# Patient Record
Sex: FEMALE | Race: WHITE | NOT HISPANIC OR LATINO
[De-identification: names, ages, dates, MRNs, and addresses within clinical notes are randomized per-mention and may not be internally consistent; named-entity substitution may affect disease eponyms.]

---

## 2023-09-15 PROBLEM — Z00.00 ENCOUNTER FOR PREVENTIVE HEALTH EXAMINATION: Status: ACTIVE | Noted: 2023-09-15

## 2023-09-18 ENCOUNTER — APPOINTMENT (OUTPATIENT)
Dept: HEMATOLOGY ONCOLOGY | Facility: CLINIC | Age: 68
End: 2023-09-18

## 2023-12-26 ENCOUNTER — NON-APPOINTMENT (OUTPATIENT)
Age: 68
End: 2023-12-26

## 2024-08-13 ENCOUNTER — APPOINTMENT (OUTPATIENT)
Dept: PAIN MANAGEMENT | Facility: CLINIC | Age: 69
End: 2024-08-13
Payer: MEDICARE

## 2024-08-13 VITALS
OXYGEN SATURATION: 97 % | HEART RATE: 61 BPM | BODY MASS INDEX: 24.4 KG/M2 | WEIGHT: 161 LBS | SYSTOLIC BLOOD PRESSURE: 127 MMHG | HEIGHT: 68 IN | DIASTOLIC BLOOD PRESSURE: 69 MMHG

## 2024-08-13 PROCEDURE — 99203 OFFICE O/P NEW LOW 30 MIN: CPT

## 2024-08-13 RX ORDER — ZOLPIDEM TARTRATE 5 MG/1
5 TABLET, FILM COATED ORAL
Refills: 0 | Status: ACTIVE | COMMUNITY

## 2024-08-13 RX ORDER — VALACYCLOVIR HYDROCHLORIDE 500 MG/1
500 TABLET, FILM COATED ORAL
Refills: 0 | Status: ACTIVE | COMMUNITY

## 2024-08-13 NOTE — HISTORY OF PRESENT ILLNESS
[Back Pain] : back pain [Joint Pain] : joint  [___ wks] : [unfilled] week(s) ago [Episodic] : episodic [7] : a maximum pain level of 7/10 [Sharp] : sharp [Dull] : dull [Aching] : aching [Throbbing] : throbbing [Sitting] : sitting [Shooting] : shooting [Transitioning] : transitioning [Bending] : bending [Lifting] : lifting [Laying] : laying [Heat] : heat [Ice] : ice [Rest] : rest [FreeTextEntry1] : FABIANO Webster, a 68-year-old female, presents with a 2-week history of episodic lumbar spine and RIGHT hip  and leg pain, rated consistently at 7/10. The pain is described as sharp, dull, aching, throbbing, and shooting, with aggravation from sitting, transitioning, bending, and lifting, and alleviation with rest, laying, heat, ice, and stretches. There is no radiation of pain, and she denies any numbness, weakness, or bowel/bladder incontinence. The pain has been functionally and emotionally disabling, hindering daily activities such as routine housework. She has attempted a provider-directed stretching regimen and over-the-counter Advil with persistent and progressing symptoms, but has not seen a pain management specialist before. [FreeTextEntry7] : lumbar spine pain and uin the right joint hip. [FreeTextEntry4] : patient does stretches which helps with the pain. Patient takes Advil over the counter.

## 2024-08-13 NOTE — DATA REVIEWED
[FreeTextEntry1] : August 5, 2024 x-ray lumbar spine Levoscoliosis with mild degenerative changes at L4-5 and L5-S1, with spondylolisthesis disc degeneration at L5-S1.

## 2024-08-13 NOTE — PHYSICAL EXAM
[de-identified] : General Appearance: Level of consciousness: Alert Body habitus: Well-nourished Signs of distress: Some noticeable discomfort. Hygiene: Clean   Psychiatric: Appropriate mood and affect. Cooperative.   Skin: Nailbeds pink with no cyanosis or clubbing. Lesions or rashes: None observed   Head and Neck: The head is normocephalic and atraumatic Eyes: Conjunctivae  clear without exudates. Sclera is non-icteric Ears: No discharge. Hearing is intact with good acuity Nose: No discharge. No nasal flaring Mouth and throat: Trachea Midline, teeth and gums are without obvious lesion   Respiratory: Breathing pattern: Normal Chest movement: Symmetrical Use of accessory muscles: Absent Cough: Absent Wheezing: Absent   Cardiovascular System: Pulse: Regular Cyanosis: Absent   Abdomen: Inspection: No distention Visible pulsations: Absent   Musculoskeletal System: Muscle strength:   strength is normal bilaterally. Gait: Steady, NO assistive device Posture: Upright Rises from a seated position with SOME difficulty due to pain     Spine: No gross deformity or signs of trauma. Curvature of the cervical, thoracic, and lumbar spine are within normal limits. Spinous processes are midline. NO tenderness of spinous processes. Paraspinal musculature is NOT hypertonic NO discomfort is noted with flexion MILD discomfort is noted with extension No discomfort is noted with side-to-side rotation   Straight leg raise test is positive RIGHT     Neurological System: Cranial nerves 2-12: Grossly intact Motor function: Moving all extremities against gravity Dorsi/plantar flexion is normal bilaterally. Sensation: No gross deficit to light touch

## 2024-08-13 NOTE — ASSESSMENT
[FreeTextEntry1] : Ms. DOMENICO HURT is a 68 year F suffering from low back and right leg pain, that based upon today's subjective complaints, physical examination, and XRAY  review, is likely secondaty to l3/4 disc protrusion suspected and associated lumbar radiculopathy  >> Medications  Chronic opioid use for non-malignant pain, in particular at high doses would not be recommended since it can potentially lead to hyperalgesia (hypersensitivity), tolerance and addiction.      >> Interventions   None indicated at this time   >> Therapy and Other Modalities    PT 2x/week for 4 weeks for lumbar stenosis Treatment includes spinal rehab, strengthening of core, glutes, abductors, LS paraspinals, Quadratus Lumborum, and targeted myofascial release. Incorporate massage techniques to reduce muscle tension and promote circulation. Myofascial release and massage will address tight zones, particularly around the scoliotic apex. Aerobic conditioning exercises to improve endurance. Stretching routines emphasizing hamstrings, hip flexors, and lumbar extensors. Modalities: US, Heat, TENS trial, and cold therapy if indicated. Manual traction may be applied for decompression. ROM exercises for maintaining joint flexibility. Precautions: Monitor for falls, adhere to universal safety guidelines, and ensure proper form during exercises   >> Imaging and Other Studies  MRI Lumbar for suspected RIGHT L3/4 disc herniation  >> Consults  F/u PCP regarding RIGHT sided sclerotic lesion on ilium

## 2024-08-23 ENCOUNTER — APPOINTMENT (OUTPATIENT)
Dept: PAIN MANAGEMENT | Facility: CLINIC | Age: 69
End: 2024-08-23
Payer: MEDICARE

## 2024-08-23 DIAGNOSIS — M54.16 RADICULOPATHY, LUMBAR REGION: ICD-10-CM

## 2024-08-23 PROCEDURE — 99213 OFFICE O/P EST LOW 20 MIN: CPT

## 2024-08-23 RX ORDER — GABAPENTIN 100 MG/1
100 CAPSULE ORAL
Qty: 90 | Refills: 5 | Status: ACTIVE | COMMUNITY
Start: 2024-08-23 | End: 1900-01-01

## 2024-08-23 NOTE — ASSESSMENT
[FreeTextEntry1] : Ms. DOMENICO HURT is a 68 year F suffering from low back and right leg pain, that based upon today's subjective complaints, physical examination, and XRAY review, is likely secondaty to l3/4 disc protrusion suspected and associated lumbar radiculopathy  >> Medications  Chronic opioid use for non-malignant pain, in particular at high doses would not be recommended since it can potentially lead to hyperalgesia (hypersensitivity), tolerance and addiction.   >> Interventions  Arrange for RIGHT L3 and 4 transforaminal epidural steroid injection under fluoroscopic guidance. Success rate and possible complications discussed with patient in detail.  >> Therapy and Other Modalities   PT 2x/week for 4 weeks for lumbar stenosis Treatment includes spinal rehab, strengthening of core, glutes, abductors, LS paraspinals, Quadratus Lumborum, and targeted myofascial release. Incorporate massage techniques to reduce muscle tension and promote circulation. Myofascial release and massage will address tight zones, particularly around the scoliotic apex. Aerobic conditioning exercises to improve endurance. Stretching routines emphasizing hamstrings, hip flexors, and lumbar extensors. Modalities: US, Heat, TENS trial, and cold therapy if indicated. Manual traction may be applied for decompression. ROM exercises for maintaining joint flexibility. Precautions: Monitor for falls, adhere to universal safety guidelines, and ensure proper form during exercises   >> Imaging and Other Studies  I have personally reviewed the images in detail together with the patient today, and I have answered all questions regarding this condition to the best of my ability, to the patient's satisfaction.  >> Consults  F/u PCP regarding RIGHT sided sclerotic lesion on ilium. What Is The Reason For Today's Visit?: Full Body Skin Examination What Is The Reason For Today's Visit? (Being Monitored For X): concerning skin lesions on an annual basis

## 2024-08-23 NOTE — HISTORY OF PRESENT ILLNESS
[Neck Pain] : neck pain [FreeTextEntry1] : Interval Note:  Since last visit the pain intensity has persisted  Recent MRI completed  Ongoing right hip and leg  Medication has been allowing patient to go about activities of daily living with less pain.  Moving bowels regularly. No recent falls.   Patient denies any bowel/bladder incontinence, no saddle/perineal anesthesia or any other red flag signs or symptoms.  ----  HPI - Alexandria Webster, a 68-year-old female, presents with a 2-week history of episodic lumbar spine and RIGHT hip  and leg pain, rated consistently at 7/10. The pain is described as sharp, dull, aching, throbbing, and shooting, with aggravation from sitting, transitioning, bending, and lifting, and alleviation with rest, laying, heat, ice, and stretches. There is no radiation of pain, and she denies any numbness, weakness, or bowel/bladder incontinence. The pain has been functionally and emotionally disabling, hindering daily activities such as routine housework. She has attempted a provider-directed stretching regimen and over-the-counter Advil with persistent and progressing symptoms, but has not seen a pain management specialist before.

## 2024-08-23 NOTE — DATA REVIEWED
[FreeTextEntry1] :  Referring Physician: Boyd Knapp DO Exam: MRI of the Lumbar Spine  HISTORY: Low back pain.  FINDINGS:  At L1-2, there are thickened ligamentum flava. At L2-3, there are thickened ligamentum flava, with a narrowed AP diameter of the spinal canal suggesting moderately severe spinal stenosis. At L3-4, there is a mild bulging annulus. There are thickened ligamentum flava and bilateral facet effusions. There is moderate severe spinal stenosis. At L4-5, there are thickened ligamentum flava. At L5-S1, unremarkable. IMPRESSION:  Moderately severe spinal stenosis at L3-4 related to a mild bulging annulus and thickened ligamentum flava. There are bilateral facet effusions which could be physiologic or suggest arthropathy. Moderately severe spinal stenosis at L2-3 related to thickened ligamentum flava and narrowed AP diameter of the spinal canal.      August 5, 2024 x-ray lumbar spine Levoscoliosis with mild degenerative changes at L4-5 and L5-S1, with spondylolisthesis disc degeneration at L5-S1.

## 2024-08-23 NOTE — REASON FOR VISIT
[Home] : at home, [unfilled] , at the time of the visit. [Medical Office: ([facility])___] : at the medical office located in  [Patient] : the patient

## 2024-08-23 NOTE — PHYSICAL EXAM
[de-identified] : Physical Exam (Telemedicine):  Gen: AAOX3, NAD HEENT: PERRLA, EOMI, NCAT, NP Pulmonary: No Signs of Respiratory Distress MSK: AROM WFL, Limitations painful ROM Neurological: Grossly neurologically intact, Noted deficits none, positive SLR RIGHT Gait: Normal, Non-antalgic, Sans AD Derm: No Rash, (-)Lesions, (-)Erythema, (-)Cyanosis  Psych: Calm, Cooperative, Conversational  Disclaimer: This is a limited examination for the purposes of conducting a telemedicine visit during the COVID-19 pandemic. Physical exam maneuvers were modified as necessary to allow patient to self perform. A focused physician physical examination will be performed during in person visits and should be referred to to determine need for further testing, interventions or degree of disability.

## 2024-09-04 ENCOUNTER — APPOINTMENT (OUTPATIENT)
Dept: PAIN MANAGEMENT | Facility: CLINIC | Age: 69
End: 2024-09-04
Payer: MEDICARE

## 2024-09-04 VITALS
DIASTOLIC BLOOD PRESSURE: 60 MMHG | HEIGHT: 68 IN | OXYGEN SATURATION: 98 % | SYSTOLIC BLOOD PRESSURE: 124 MMHG | BODY MASS INDEX: 24.4 KG/M2 | HEART RATE: 65 BPM | WEIGHT: 161 LBS

## 2024-09-04 DIAGNOSIS — M43.10 SPONDYLOLISTHESIS, SITE UNSPECIFIED: ICD-10-CM

## 2024-09-04 PROCEDURE — 99213 OFFICE O/P EST LOW 20 MIN: CPT

## 2024-09-04 NOTE — DATA REVIEWED
[FreeTextEntry1] :   HISTORY: Low back pain.  FINDINGS:  At L1-2, there are thickened ligamentum flava. At L2-3, there are thickened ligamentum flava, with a narrowed AP diameter of the spinal canal suggesting moderately severe spinal stenosis. At L3-4, there is a mild bulging annulus. There are thickened ligamentum flava and bilateral facet effusions. There is moderate severe spinal stenosis. At L4-5, there are thickened ligamentum flava. At L5-S1, unremarkable. IMPRESSION:  Moderately severe spinal stenosis at L3-4 related to a mild bulging annulus and thickened ligamentum flava. There are bilateral facet effusions which could be physiologic or suggest arthropathy. Moderately severe spinal stenosis at L2-3 related to thickened ligamentum flava and narrowed AP diameter of the spinal canal.      August 5, 2024 x-ray lumbar spine Levoscoliosis with mild degenerative changes at L4-5 and L5-S1, with spondylolisthesis disc degeneration at L5-S1.

## 2024-09-04 NOTE — ASSESSMENT
[FreeTextEntry1] : Ms. DOMENICO HURT is a 68 year F suffering from low back and right leg pain, that based upon today's subjective complaints, physical examination, and XRAY review, is likely secondaty to l3/4 disc protrusion suspected and associated lumbar radiculopathy  >> Medications  Chronic opioid use for non-malignant pain, in particular at high doses would not be recommended since it can potentially lead to hyperalgesia (hypersensitivity), tolerance and addiction.   >> Interventions  Consider for RIGHT L3 and 4 transforaminal epidural steroid injection under fluoroscopic guidance. Success rate and possible complications discussed with patient in detail.  >> Therapy and Other Modalities   PT 2x/week for 4 weeks for lumbar stenosis Treatment includes spinal rehab, strengthening of core, glutes, abductors, LS paraspinals, Quadratus Lumborum, and targeted myofascial release. Incorporate massage techniques to reduce muscle tension and promote circulation. Myofascial release and massage will address tight zones, particularly around the scoliotic apex. Aerobic conditioning exercises to improve endurance. Stretching routines emphasizing hamstrings, hip flexors, and lumbar extensors. Modalities: US, Heat, TENS trial, and cold therapy if indicated. Manual traction may be applied for decompression. ROM exercises for maintaining joint flexibility. Precautions: Monitor for falls, adhere to universal safety guidelines, and ensure proper form during exercises   >> Imaging and Other Studies  I have personally reviewed the images in detail together with the patient today, and I have answered all questions regarding this condition to the best of my ability, to the patient's satisfaction.  >> Consults  F/u PCP regarding RIGHT sided sclerotic lesion on ilium.

## 2024-09-04 NOTE — PHYSICAL EXAM
[de-identified] : General Appearance: Level of consciousness: Alert Body habitus: Well-nourished Signs of distress: Some noticeable discomfort. Hygiene: Clean   Psychiatric: Appropriate mood and affect. Cooperative.   Skin: Nailbeds pink with no cyanosis or clubbing. Lesions or rashes: None observed   Head and Neck: The head is normocephalic and atraumatic Eyes: Conjunctivae  clear without exudates. Sclera is non-icteric Ears: No discharge. Hearing is intact with good acuity Nose: No discharge. No nasal flaring Mouth and throat: Trachea Midline, teeth and gums are without obvious lesion   Respiratory: Breathing pattern: Normal Chest movement: Symmetrical Use of accessory muscles: Absent Cough: Absent Wheezing: Absent   Cardiovascular System: Pulse: Regular Cyanosis: Absent   Abdomen: Inspection: No distention Visible pulsations: Absent   Musculoskeletal System: Muscle strength:   strength is normal bilaterally. Gait: Steady, NO assistive device Posture: Upright Rises from a seated position with SOME difficulty due to pain     Spine: No gross deformity or signs of trauma. Curvature of the cervical, thoracic, and lumbar spine are within normal limits. Spinous processes are midline. NO tenderness of spinous processes. Paraspinal musculature is NOT hypertonic NO discomfort is noted with flexion MILD discomfort is noted with extension No discomfort is noted with side-to-side rotation   Straight leg raise test is negative bilaterally.   Joint examination: No Swelling, No gross deformities.     Neurological System: Cranial nerves 2-12: Grossly intact Motor function: Moving all extremities against gravity Dorsi/plantar flexion is normal bilaterally. Sensation: No gross deficit to light touch

## 2024-09-04 NOTE — HISTORY OF PRESENT ILLNESS
[FreeTextEntry1] : Interval Note:  Since last visit the pain intensity has persisted  Recent MRI completed - brought imaging in for review (CD)  Ongoing right hip and leg  Medication has been allowing patient to go about activities of daily living with less pain.  Moving bowels regularly. No recent falls.  Patient denies any bowel/bladder incontinence, no saddle/perineal anesthesia or any other red flag signs or symptoms.  ----  HPI - Alexandria Webster, a 68-year-old female, presents with a 2-week history of episodic lumbar spine and RIGHT hip and leg pain, rated consistently at 7/10. The pain is described as sharp, dull, aching, throbbing, and shooting, with aggravation from sitting, transitioning, bending, and lifting, and alleviation with rest, laying, heat, ice, and stretches. There is no radiation of pain, and she denies any numbness, weakness, or bowel/bladder incontinence. The pain has been functionally and emotionally disabling, hindering daily activities such as routine housework. She has attempted a provider-directed stretching regimen and over-the-counter Advil with persistent and progressing symptoms, but has not seen a pain management specialist before.